# Patient Record
Sex: MALE | Race: WHITE | Employment: FULL TIME | URBAN - METROPOLITAN AREA
[De-identification: names, ages, dates, MRNs, and addresses within clinical notes are randomized per-mention and may not be internally consistent; named-entity substitution may affect disease eponyms.]

---

## 2022-01-13 ENCOUNTER — ANESTHESIA EVENT (OUTPATIENT)
Dept: ENDOSCOPY | Age: 67
End: 2022-01-13
Payer: COMMERCIAL

## 2022-01-14 ENCOUNTER — ANESTHESIA (OUTPATIENT)
Dept: ENDOSCOPY | Age: 67
End: 2022-01-14
Payer: COMMERCIAL

## 2022-01-14 ENCOUNTER — HOSPITAL ENCOUNTER (OUTPATIENT)
Age: 67
Setting detail: OUTPATIENT SURGERY
Discharge: HOME | End: 2022-01-14
Attending: INTERNAL MEDICINE | Admitting: INTERNAL MEDICINE
Payer: COMMERCIAL

## 2022-01-14 VITALS
OXYGEN SATURATION: 95 % | WEIGHT: 315 LBS | SYSTOLIC BLOOD PRESSURE: 143 MMHG | HEIGHT: 74 IN | TEMPERATURE: 98.3 F | DIASTOLIC BLOOD PRESSURE: 81 MMHG | HEART RATE: 70 BPM | BODY MASS INDEX: 40.43 KG/M2 | RESPIRATION RATE: 18 BRPM

## 2022-01-14 VITALS
OXYGEN SATURATION: 92 % | SYSTOLIC BLOOD PRESSURE: 148 MMHG | RESPIRATION RATE: 10 BRPM | DIASTOLIC BLOOD PRESSURE: 67 MMHG

## 2022-01-14 DIAGNOSIS — Z12.11 COLON CANCER SCREENING: ICD-10-CM

## 2022-01-14 LAB
GLUCOSE BLD-MCNC: 227 MG/DL (ref 70–99)
PERFORMED ON: ABNORMAL

## 2022-01-14 PROCEDURE — 6360000002 HC RX W HCPCS: Performed by: NURSE ANESTHETIST, CERTIFIED REGISTERED

## 2022-01-14 PROCEDURE — 7100000010 HC PHASE II RECOVERY - FIRST 15 MIN: Performed by: INTERNAL MEDICINE

## 2022-01-14 PROCEDURE — 2709999900 HC NON-CHARGEABLE SUPPLY: Performed by: INTERNAL MEDICINE

## 2022-01-14 PROCEDURE — 7100000011 HC PHASE II RECOVERY - ADDTL 15 MIN: Performed by: INTERNAL MEDICINE

## 2022-01-14 PROCEDURE — 2500000003 HC RX 250 WO HCPCS: Performed by: NURSE ANESTHETIST, CERTIFIED REGISTERED

## 2022-01-14 PROCEDURE — 3700000001 HC ADD 15 MINUTES (ANESTHESIA): Performed by: INTERNAL MEDICINE

## 2022-01-14 PROCEDURE — 2580000003 HC RX 258: Performed by: ANESTHESIOLOGY

## 2022-01-14 PROCEDURE — 3700000000 HC ANESTHESIA ATTENDED CARE: Performed by: INTERNAL MEDICINE

## 2022-01-14 PROCEDURE — 3609010600 HC COLONOSCOPY POLYPECTOMY SNARE/COLD BIOPSY: Performed by: INTERNAL MEDICINE

## 2022-01-14 PROCEDURE — 88305 TISSUE EXAM BY PATHOLOGIST: CPT

## 2022-01-14 RX ORDER — RIVAROXABAN 10 MG/1
10 TABLET, FILM COATED ORAL
COMMUNITY

## 2022-01-14 RX ORDER — SODIUM CHLORIDE, SODIUM LACTATE, POTASSIUM CHLORIDE, CALCIUM CHLORIDE 600; 310; 30; 20 MG/100ML; MG/100ML; MG/100ML; MG/100ML
INJECTION, SOLUTION INTRAVENOUS CONTINUOUS
Status: DISCONTINUED | OUTPATIENT
Start: 2022-01-14 | End: 2022-01-14 | Stop reason: HOSPADM

## 2022-01-14 RX ORDER — CARVEDILOL 25 MG/1
25 TABLET ORAL 2 TIMES DAILY WITH MEALS
COMMUNITY

## 2022-01-14 RX ORDER — PROPOFOL 10 MG/ML
INJECTION, EMULSION INTRAVENOUS PRN
Status: DISCONTINUED | OUTPATIENT
Start: 2022-01-14 | End: 2022-01-14 | Stop reason: SDUPTHER

## 2022-01-14 RX ORDER — LIDOCAINE HYDROCHLORIDE 20 MG/ML
INJECTION, SOLUTION INFILTRATION; PERINEURAL PRN
Status: DISCONTINUED | OUTPATIENT
Start: 2022-01-14 | End: 2022-01-14 | Stop reason: SDUPTHER

## 2022-01-14 RX ADMIN — PROPOFOL 50 MG: 10 INJECTION, EMULSION INTRAVENOUS at 10:22

## 2022-01-14 RX ADMIN — PROPOFOL 100 MG: 10 INJECTION, EMULSION INTRAVENOUS at 10:13

## 2022-01-14 RX ADMIN — PROPOFOL 50 MG: 10 INJECTION, EMULSION INTRAVENOUS at 10:19

## 2022-01-14 RX ADMIN — PROPOFOL 50 MG: 10 INJECTION, EMULSION INTRAVENOUS at 10:29

## 2022-01-14 RX ADMIN — PROPOFOL 50 MG: 10 INJECTION, EMULSION INTRAVENOUS at 10:24

## 2022-01-14 RX ADMIN — PROPOFOL 50 MG: 10 INJECTION, EMULSION INTRAVENOUS at 10:16

## 2022-01-14 RX ADMIN — SODIUM CHLORIDE, POTASSIUM CHLORIDE, SODIUM LACTATE AND CALCIUM CHLORIDE: 600; 310; 30; 20 INJECTION, SOLUTION INTRAVENOUS at 09:28

## 2022-01-14 RX ADMIN — LIDOCAINE HYDROCHLORIDE 100 MG: 20 INJECTION, SOLUTION INFILTRATION; PERINEURAL at 10:13

## 2022-01-14 ASSESSMENT — PULMONARY FUNCTION TESTS
PIF_VALUE: 1

## 2022-01-14 ASSESSMENT — PAIN - FUNCTIONAL ASSESSMENT: PAIN_FUNCTIONAL_ASSESSMENT: 0-10

## 2022-01-14 ASSESSMENT — LIFESTYLE VARIABLES: SMOKING_STATUS: 1

## 2022-01-14 ASSESSMENT — PAIN SCALES - GENERAL: PAINLEVEL_OUTOF10: 0

## 2022-01-14 NOTE — PROGRESS NOTES
Discharge instructions reviewed with patient/responsible adult and understanding verbalized. Discharge instructions signed and copies given. Patient discharged home with belongings.   Left per w/c to go home with wife

## 2022-01-14 NOTE — PROCEDURES
Colonoscopy Procedure Note      Patient: Morales Buchanan  : 1955  Acct#:     Procedure: Colonoscopy with polypectomy (cold snare)    Date:  2022    Surgeon:  Todd Vegas MD, MD    Referring Physician:  Zoë Norris MD      Preoperative Diagnosis: History of colon polyps      Consent:  The patient or their legal guardian has signed a consent, and is aware of the potential risks, benefits, alternatives, and potential complications of this procedure. These include, but are not limited to hemorrhage, bleeding, post procedural pain, perforation, phlebitis, aspiration, hypotension, hypoxia, cardiovascular events such as arryhthmia, and possibly death. Additionally, the possibility of missed colonic polyps and interval colon cancer was discussed in the consent. Anesthesia: As per anesthesiologist      Procedure description:   An informed consent was obtained from the patient after explanation of indications, benefits, possible risks and complications of the procedure. The patient was then taken to the endoscopy suite, placed in the left lateral decubitus position, and the above IV anesthesia was administered. A digital rectal examination was performed and revealed negative without mass, lesions or tenderness. The Olympus video colonoscope was placed in the patient's rectum under digital direction and advanced to the cecum. The cecum was identified by IC valve and appendiceal orifice. The prep was good. The ileocecal valve was identified and  intubated. The scope was then withdrawn back through the cecum, ascending, transverse, descending and sigmoid colons. Carefull circumferential examination of the mucosa was performed. The scope was then withdrawn into the rectum and retroflexed. The scope was straightened, the colon was decompressed and the scope was withdrawn from the patient.   The patient tolerated the procedure well and was taken to the PACU in good condition. Findings:  Visualization of the terminal ileum demonstrated normal mucosa. The mucosa in cecum, ascending colon, transverse colon, descending colon, sigmoid colon and rectum was normal.  5 mm polyp in the ascending colon, removed by cold snare polypectomy. 3 small polyps in the sigmoid colon, 3 to 4 mm in size, hyperplastic appearing, removed by cold polypectomy. Retroflexion in the rectum revealed medium sized internal hemorrhoids. Impression:    · 5 mm polyp in the ascending colon, removed by cold snare polypectomy.     · 3 small polyps in the sigmoid colon, 3 to 4 mm in size, hyperplastic appearing, removed by cold polypectomy        Recommendations:    · Follow-up pathology results  · High-fiber diet  · Recommend surveillance colonoscopy in 5 years          Gerhard Uribe MD,  Milana Jamil    1/14/2022

## 2022-01-14 NOTE — H&P
Pre-operative History and Physical    Patient: Angeles Harrison  : 1955  Acct#:     History Obtained From:  patient    HISTORY OF PRESENT ILLNESS:    The patient is a 73 y.o. 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 Metformin ER/XR 2gms at dinner added.  DVT (deep venous thrombosis) ([facility] [address]     lower left extremity    Dyslipidemia     Dyslipidemia, goal LDL below 70     Hypertension      Past Surgical History:        Procedure Laterality Date   Maxwell Escalona - removal of hemorrhagic cyst     COLONOSCOPY  3/8/12    Dr. Dana Werner - multiple small rectosigmoid polyps, benign    CORONARY ARTERY BYPASS GRAFT  2000    2 vessel    KNEE CAPSULOTOMY Left     Dr. Nancy Escalona - removal ruptured Cottie Dandy    Dr. Nancy Escalona - arthroscopic - for recurrent dislocation     Medications Prior to Admission:   No current facility-administered medications on file prior to encounter. Current Outpatient Medications on File Prior to Encounter   Medication Sig Dispense Refill    rivaroxaban (XARELTO) 10 MG TABS tablet Take 10 mg by mouth      carvedilol (COREG) 25 MG tablet Take 25 mg by mouth 2 times daily (with meals)      cyanocobalamin ([employer] VITAMIN B12) 1000 MCG tablet Everyone should take OTC B12 1000 mcg twice a day for neurological health (including the nerves that eminate from our spine, ie sciatic nerves) and as a natural energy booster (read a bottle of 5 Hour Energy or diet Red Bull). High B12 levels are proven to help prevent dementia. 180 tablet 12    Cholecalciferol ([employer]S VIT D 5000 HIGH-POTENCY) 5000 UNITS CAPS capsule All patients should take 5000 IU or more Vitamin D every day for good general health.  There and benefits reviewed with patient. Patient expresses understanding.       Alfonzo Samuel MD,

## 2022-01-14 NOTE — ANESTHESIA POSTPROCEDURE EVALUATION
Department of Anesthesiology  Postprocedure Note    Patient: Isiah Waldrop  MRN: [id number]  YOB: 1955  Date of evaluation: 1/14/2022  Time:  10:48 AM     Procedure Summary     Date: 01/14/22 Room / Location: [facility]    Anesthesia Start: 1008 Anesthesia Stop: 1037    Procedure: COLONOSCOPY POLYPECTOMY SNARE/COLD BIOPSY (N/A ) Diagnosis:       Colon cancer screening      (Colon cancer screening [Z12.11])    Surgeons: Sawyer Acosta MD Responsible Provider: Tamir Ospina DO    Anesthesia Type: MAC ASA Status: 3          Anesthesia Type: MAC    Maynor Phase I: Maynor Score: 10    Maynor Phase II: Maynor Score: 10    Last vitals: Reviewed and per EMR flowsheets.        Anesthesia Post Evaluation    Patient location during evaluation: PACU  Patient participation: complete - patient participated  Level of consciousness: awake and alert  Pain score: 0  Airway patency: patent  Nausea & Vomiting: no nausea and no vomiting  Complications: no  Cardiovascular status: hemodynamically stable  Respiratory status: acceptable  Hydration status: stable

## 2022-01-14 NOTE — ANESTHESIA PRE PROCEDURE
Department of Anesthesiology  Preprocedure Note       Name:  Charisse Candelaria   Age:  73 y.o.  :  1955                                          MRN:  [id number]         Date:  2022      Surgeon: Brodie Charles):  Jeffrey Bates MD    Procedure: Procedure(s):  COLONOSCOPY    Medications prior to admission:   Prior to Admission medications    Medication Sig Start Date End Date Taking? Authorizing Provider   cyanocobalamin ([employer] VITAMIN B12) 1000 MCG tablet Everyone should take OTC B12 1000 mcg twice a day for neurological health (including the nerves that eminate from our spine, ie sciatic nerves) and as a natural energy booster (read a bottle of 5 Hour Energy or diet Red Bull). High B12 levels are proven to help prevent dementia. 14   Isaura Talamantes MD   Cholecalciferol ([employer]S VIT D 5000 HIGH-POTENCY) 5000 UNITS CAPS capsule All patients should take 5000 IU or more Vitamin D every day for good general health. There are myriad benefits to higher Vitamin D levels. 14   Isaura Talamantes MD   metFORMIN ER, MOD, (GLUMETZA) 1000 MG ER tablet 2 tabs with dinner for diabetes & triglycerides. (NOT MILTON, use any ER/XR metformin as ins covers) 14   Isaura Talamantse MD   tadalafil (CIALIS) 5 MG tablet Use 10-20mg every other day as needed for ED. 14   Isaura Talamantes MD   Aspirin Buf,FuMed-TwUrz-CuLnp, (BUFFERED ASPIRIN) 325 MG TABS 1 tab daily, blood thinner for heart. 14   Isaura Talamantes MD   losartan-hydrochlorothiazide (HYZAAR) 100-25 MG per tablet TAKE ONE TABLET BY MOUTH EVERY DAY FOR BLOOD PRESSURE 14   Isaura Talamantes MD   metoprolol (TOPROL-XL) 50 MG XL tablet Take 1 tablet by mouth daily. For BP & heart. 14   Isaura Talamantes MD   amLODIPine (NORVASC) 10 MG tablet Take 1 tablet by mouth daily. For blood pressure. 14   Isaura Talamantes MD   atorvastatin (LIPITOR) 40 MG tablet Take 1 tablet by mouth nightly. For cholesterol & heart.  14   Isaura Talamantes MD   acetaminophen-codeine (TYLENOL/CODEINE #3) 300-30 MG per tablet 1 or 2 tabs 3 times a day as needed severe pain. 11/14/14   Yareli Billings MD   potassium chloride (K-DUR) 10 MEQ tablet 1 daily for potassium. 11/14/14   Yareli Billings MD   cyclobenzaprine (FLEXERIL) 10 MG tablet Take 1 tablet by mouth 3 times daily as needed. Back spasm. 11/14/14   Yareli Billings MD   warfarin (COUMADIN) 5 MG tablet TAKE 2-3 TABLETS BY MOUTH ONCE DAILY . ADJUST TO MAINTAIN INR 2.0-3.0. Patient taking differently: Take 10 mg m,w,f  7.5mg T,th,sa,nguyen 11/11/13   Yareli Billings MD   nitroGLYCERIN (NITROSTAT) 0.4 MG SL tablet Place 0.4 mg under the tongue every 5 minutes as needed. Historical Provider, MD       Current medications:    No current facility-administered medications for this encounter. Allergies: Allergies   Allergen Reactions    Hydrocortisone Other (See Comments)     Cobblestone type rash on eyelids        Problem List:    Patient Active Problem List   Diagnosis Code    Coronary artery disease I25.10    Dyslipidemia, goal LDL below 70 E78.5    Hypertension I10    DVT (deep venous thrombosis) ([address] I82.409    Back pain M54.9    Diabetes mellitus ([address] E11.9       Past Medical History:        Diagnosis Date    Back pain 11/11/2013    Lifting box Fall, superimposed on chronic    Coronary artery disease     Diabetes mellitus ([address] 12/1/2014    A1C 7.6% 11/14/14. Metformin ER/XR 2gms at dinner added.     DVT (deep venous thrombosis) ([address] 2007    lower left extremity    Dyslipidemia     Dyslipidemia, goal LDL below 70     Hypertension        Past Surgical History:        Procedure Laterality Date   Yady Moses - removal of hemorrhagic cyst     COLONOSCOPY  3/8/12    Dr. Nataliia Mobley - multiple small rectosigmoid polyps, benign    CORONARY ARTERY BYPASS GRAFT  6/2000    2 vessel    KNEE CAPSULOTOMY Left 1973    Dr. Javi Moses - removal ruptured bursae    KNEE SURGERY Right [address] [place] [address] Right [facility] Dr. Satinder Michel - laine - for recurrent dislocation       Social History:    Social History     Tobacco Use    Smoking status: Former Smoker     Packs/day: 1.00     Years: 13.00     Pack years: 13.00     Quit date: 2000     Years since quittin.0    Smokeless tobacco: Never Used    Tobacco comment: Not needed, quit    Substance Use Topics    Alcohol use: Yes     Alcohol/week: 5.0 - 6.7 standard drinks     Types: 6 - 8 drink(s) per week     Comment: bourbon or beer                                Counseling given: Not Answered  Comment: Not needed, quit       Vital Signs (Current): There were no vitals filed for this visit. BP Readings from Last 3 Encounters:   14 146/84   13 126/80       NPO Status: Time of last liquid consumption: 0300                        Time of last solid consumption: 2300                                                 Date of last solid food consumption: 22    BMI:   Wt Readings from Last 3 Encounters:   14 (!) 358 lb (162.4 kg)   13 (!) 358 lb 9.6 oz (162.7 kg)     There is no height or weight on file to calculate BMI.    CBC:   Lab Results   Component Value Date    WBC 5.7 2013    RBC 4.54 2013    HGB 13.9 2013    HCT 41.6 2013    MCV 91.6 2013    RDW 13.2 2013     2013       CMP:   Lab Results   Component Value Date     2014    K 3.6 2014    CL 99 2014    CO2 23 2014    BUN 15 2014    CREATININE 0.9 2014    GFRAA >60 2014    AGRATIO 1.7 2014    LABGLOM >60 2014    GLUCOSE 134 2014    PROT 7.2 2014    CALCIUM 9.4 2014    BILITOT 0.8 2014    ALKPHOS 72 2014    AST 47 2014    ALT 49 2014       POC Tests: No results for input(s): POCGLU, POCNA, POCK, POCCL, POCBUN, POCHEMO, POCHCT in the last 72 hours.     Coags:   Lab Results   Component Value Date PROTIME 24.0 11/14/2014    INR 2.14 11/14/2014       HCG (If Applicable): No results found for: PREGTESTUR, PREGSERUM, HCG, HCGQUANT     ABGs: No results found for: PHART, PO2ART, OWJ1YDF, MKS6KMG, BEART, C5ENCOVO     Type & Screen (If Applicable):  No results found for: LABABO, LABRH    Drug/Infectious Status (If Applicable):  No results found for: HIV, HEPCAB    COVID-19 Screening (If Applicable): No results found for: COVID19        Anesthesia Evaluation  Patient summary reviewed and Nursing notes reviewed no history of anesthetic complications:   Airway: Mallampati: II  TM distance: >3 FB   Neck ROM: full  Mouth opening: > = 3 FB Dental: normal exam         Pulmonary: breath sounds clear to auscultation  (+) sleep apnea: on CPAP,  current smoker (quit 2000   13 pk yrs )                           Cardiovascular:  Exercise tolerance: good (>4 METS),   (+) hypertension: moderate, CAD (cabg  2 V   20 yrs ago ):,       NYHA Classification: II    Rhythm: regular  Rate: normal           Beta Blocker:  Dose within 24 Hrs         Neuro/Psych:   Negative Neuro/Psych ROS              GI/Hepatic/Renal:   (+) bowel prep, morbid obesity (bmi 45)     (-) GERD       Endo/Other:    (+) DiabetesType II DM, well controlled, , .                 Abdominal:             Vascular: negative vascular ROS.  + DVT (hx 2007  off coumadin since Monday ), . Other Findings:             Anesthesia Plan      MAC     ASA 3       Induction: intravenous. MIPS: Prophylactic antiemetics administered. Anesthetic plan and risks discussed with patient. Plan discussed with CRNA.     Attending anesthesiologist reviewed and agrees with Preprocedure content              Frank Calloway DO   1/14/2022

## (undated) DEVICE — TRAP POLYP ETRAP

## (undated) DEVICE — CANNULA SAMP CO2 AD GRN 7FT CO2 AND 7FT O2 TBNG UNIV CONN

## (undated) DEVICE — SNARE COLD DIAMOND 10MM THIN